# Patient Record
Sex: FEMALE | Race: WHITE | NOT HISPANIC OR LATINO | Employment: OTHER | URBAN - NONMETROPOLITAN AREA
[De-identification: names, ages, dates, MRNs, and addresses within clinical notes are randomized per-mention and may not be internally consistent; named-entity substitution may affect disease eponyms.]

---

## 2023-06-29 ENCOUNTER — HOSPITAL ENCOUNTER (OUTPATIENT)
Dept: GENERAL RADIOLOGY | Facility: OTHER | Age: 73
Discharge: HOME | End: 2023-06-29
Attending: STUDENT IN AN ORGANIZED HEALTH CARE EDUCATION/TRAINING PROGRAM
Payer: MEDICARE

## 2023-06-29 ENCOUNTER — OFFICE VISIT (OUTPATIENT)
Dept: FAMILY MEDICINE | Facility: OTHER | Age: 73
End: 2023-06-29
Attending: NURSE PRACTITIONER
Payer: MEDICARE

## 2023-06-29 VITALS
HEIGHT: 65 IN | HEART RATE: 95 BPM | TEMPERATURE: 99.4 F | BODY MASS INDEX: 27.99 KG/M2 | SYSTOLIC BLOOD PRESSURE: 132 MMHG | WEIGHT: 168 LBS | OXYGEN SATURATION: 99 % | DIASTOLIC BLOOD PRESSURE: 68 MMHG | RESPIRATION RATE: 24 BRPM

## 2023-06-29 DIAGNOSIS — R05.2 SUBACUTE COUGH: ICD-10-CM

## 2023-06-29 DIAGNOSIS — R05.2 SUBACUTE COUGH: Primary | ICD-10-CM

## 2023-06-29 DIAGNOSIS — J40 BRONCHITIS: ICD-10-CM

## 2023-06-29 PROCEDURE — 99203 OFFICE O/P NEW LOW 30 MIN: CPT | Performed by: STUDENT IN AN ORGANIZED HEALTH CARE EDUCATION/TRAINING PROGRAM

## 2023-06-29 PROCEDURE — G0463 HOSPITAL OUTPT CLINIC VISIT: HCPCS

## 2023-06-29 PROCEDURE — G0463 HOSPITAL OUTPT CLINIC VISIT: HCPCS | Mod: 25 | Performed by: STUDENT IN AN ORGANIZED HEALTH CARE EDUCATION/TRAINING PROGRAM

## 2023-06-29 PROCEDURE — 71046 X-RAY EXAM CHEST 2 VIEWS: CPT

## 2023-06-29 RX ORDER — AZITHROMYCIN 250 MG/1
TABLET, FILM COATED ORAL
Qty: 6 TABLET | Refills: 0 | Status: SHIPPED | OUTPATIENT
Start: 2023-06-29 | End: 2023-07-04

## 2023-06-29 RX ORDER — METOPROLOL SUCCINATE 50 MG/1
25 TABLET, EXTENDED RELEASE ORAL
COMMUNITY
Start: 2021-09-24

## 2023-06-29 RX ORDER — GLIMEPIRIDE 4 MG/1
4 TABLET ORAL
COMMUNITY
Start: 2022-08-03

## 2023-06-29 RX ORDER — ATORVASTATIN CALCIUM 40 MG/1
1 TABLET, FILM COATED ORAL AT BEDTIME
COMMUNITY
Start: 2022-03-22

## 2023-06-29 RX ORDER — ASPIRIN 81 MG/1
81 TABLET, CHEWABLE ORAL
COMMUNITY

## 2023-06-29 RX ORDER — VITAMIN B COMPLEX
1000 TABLET ORAL DAILY
COMMUNITY

## 2023-06-29 RX ORDER — LEVOCETIRIZINE DIHYDROCHLORIDE 5 MG/1
5 TABLET, FILM COATED ORAL EVERY EVENING
COMMUNITY
Start: 2021-09-13

## 2023-06-29 RX ORDER — BENZONATATE 100 MG/1
100 CAPSULE ORAL 3 TIMES DAILY PRN
Qty: 15 CAPSULE | Refills: 0 | Status: SHIPPED | OUTPATIENT
Start: 2023-06-29 | End: 2023-07-04

## 2023-06-29 ASSESSMENT — PAIN SCALES - GENERAL: PAINLEVEL: EXTREME PAIN (8)

## 2023-06-29 NOTE — NURSING NOTE
Chief Complaint   Patient presents with     Cough     X 6 days     Throat Problem     X 6 days worsening       Sinus Problem     Patient in clinic with Grand-daughter.  Tx pain with tylenol  Visiting from out of town.    Advanced Care Planning on file? no    Medication Review Completed: complete    FOOD SECURITY SCREENING QUESTIONS:    The next two questions are to help us understand your food security.  If you are feeling you need any assistance in this area, we have resources available to support you today.    Hunger Vital Signs:  Within the past 12 months we worried whether our food would run out before we got money to buy more. Never  Within the past 12 months the food we bought just didn't last and we didn't have money to get more. Never    Melony Mendenhall LPN

## 2023-06-29 NOTE — PATIENT INSTRUCTIONS
Bronchitis    Azithromycin.  Tessalon Perles for cough.  Robitussin for suppression of cough.  Mucinex to thin secretions.    Lots of fluids.  Cough drops.    Follow-up with primary care if symptoms persist.  Return to rapid clinic or go to the emergency room if symptoms worsen or change.

## 2023-06-29 NOTE — PROGRESS NOTES
"  Assessment & Plan     (J40) Bronchitis  Comment: Most likely bronchitis.  Cough also accompanies runny nose, and sore throat.  She is about 7 days into symptoms, does seem to be getting significantly worse.  Temperature 99.4 in the office, other vital signs are stable.    Plan: benzonatate (TESSALON) 100 MG capsule,         azithromycin (ZITHROMAX) 250 MG tablet    Plan to treat with Tessalon Perles, azithromycin for possible start of bacterial bronchitis.  Discussed use of other over-the-counter cough medications.  Follow-up with PCP if symptoms persist.  Return to rapid clinic or go to the ER if symptoms worsen or change.  She is agreeable.    (R05.2) Subacute cough  (primary encounter diagnosis)  Comment: With the persistence and worsening of cough, x-ray was completed today.  No evidence of pneumonia, consolidation, pleural effusion, or pneumothorax.    Plan: XR Chest 2 Views, benzonatate (TESSALON) 100 MG        capsule      Rupinder Jones PA-C  [facility]        Subjective   Kayla is a 73 year old, presenting for the following health issues:  Cough (X 6 days), Throat Problem (X 6 days worsening/), and Sinus Problem      HPI     Patient presents today for concerns of worsening cough.  States it started about a week ago but over the past 2 days has gotten significantly worse.  It is sometimes productive.  She has also had a runny nose, nasal congestion.  She does have a sore throat that seems to come and go.  She did have body aches early on which have since resolved.  She been using Tylenol and Vicks.  She has had some associated chest pain with coughing.      Review of Systems   Constitutional, HEENT, cardiovascular, pulmonary, gi and gu systems are negative, except as otherwise noted.          Objective    /68 (BP Location: Left arm, Patient Position: Sitting, Cuff Size: Adult Regular)   Pulse 95   Temp 99.4  F (37.4  C) (Tympanic)   Resp 24   Ht 1.638 m (5' 4.5\")   Wt " 76.2 kg (168 lb)   SpO2 99%   BMI 28.39 kg/m    Body mass index is 28.39 kg/m .     Physical Exam   GENERAL: healthy, alert and no distress  EYES: Eyes grossly normal to inspection, PERRL and conjunctivae and sclerae normal  HENT: ear canals and TM's normal, nose and mouth without ulcers or lesions  NECK: no adenopathy, no asymmetry  RESP: Intermittent coarse cough, upper airway faint crackles, lower lungs lungs clear to auscultation - no rales, rhonchi or wheezes  CV: regular rate and rhythm  MS: no gross musculoskeletal defects noted, no edema      Results for orders placed or performed during the hospital encounter of 06/29/23   XR Chest 2 Views     Status: None    Narrative    PROCEDURE: XR CHEST 2 VIEWS 6/29/2023 11:28 AM    HISTORY: cough x 1 week; Subacute cough    COMPARISONS: None.    TECHNIQUE: 2 views.    FINDINGS: Heart is not enlarged. Lungs are clear and no pleural  effusion is seen.    Surgical clips project over the right axilla. There is mild  degenerative change in the mid thoracic spine.         Impression    IMPRESSION: No acute disease.    ELIUD DE LA CRUZ MD         SYSTEM ID:  [id number]